# Patient Record
Sex: FEMALE | Race: WHITE | NOT HISPANIC OR LATINO
[De-identification: names, ages, dates, MRNs, and addresses within clinical notes are randomized per-mention and may not be internally consistent; named-entity substitution may affect disease eponyms.]

---

## 2022-11-04 ENCOUNTER — NON-APPOINTMENT (OUTPATIENT)
Age: 52
End: 2022-11-04

## 2024-06-04 ENCOUNTER — NON-APPOINTMENT (OUTPATIENT)
Age: 54
End: 2024-06-04

## 2024-06-11 ENCOUNTER — NON-APPOINTMENT (OUTPATIENT)
Age: 54
End: 2024-06-11

## 2024-06-18 ENCOUNTER — NON-APPOINTMENT (OUTPATIENT)
Age: 54
End: 2024-06-18

## 2024-06-18 ENCOUNTER — APPOINTMENT (OUTPATIENT)
Dept: SURGICAL ONCOLOGY | Facility: CLINIC | Age: 54
End: 2024-06-18
Payer: COMMERCIAL

## 2024-06-18 VITALS
HEART RATE: 78 BPM | OXYGEN SATURATION: 98 % | HEIGHT: 63 IN | WEIGHT: 128 LBS | DIASTOLIC BLOOD PRESSURE: 90 MMHG | SYSTOLIC BLOOD PRESSURE: 120 MMHG | BODY MASS INDEX: 22.68 KG/M2

## 2024-06-18 DIAGNOSIS — N61.0 MASTITIS WITHOUT ABSCESS: ICD-10-CM

## 2024-06-18 PROCEDURE — 99203 OFFICE O/P NEW LOW 30 MIN: CPT

## 2024-06-18 NOTE — CONSULT LETTER
[Dear  ___] : Dear ~SHERIDAN, [Consult Letter:] : I had the pleasure of evaluating your patient, [unfilled]. [Please see my note below.] : Please see my note below. [Sincerely,] : Sincerely, [FreeTextEntry2] : Dr. Kaylyn Coffey [FreeTextEntry3] : Sawyer Rosales M.D., FTAMARA. Associate Professor of [facility] [employer] [facility] [employer] at [facility]

## 2024-06-18 NOTE — ASSESSMENT
[FreeTextEntry1] : IMP: Carlene is a 54 y.o female with right breast infection. Completed antibiotics and symptoms have improved Patient plans to have breast reduction in near future, its important that she has her annual breast imaging 1 month prior to surgery.  PLAN: MMG/US 10/2024- ordered by GYN Return as needed or symptoms worsen   All medical entries were at my, Dr. Sawyer Rosales, direction. I have reviewed the chart and agree that the record accurately reflects my personal performance of the history, physical exam, assessment and plan. Our office Nurse Practitioner was present of the duration of the office visit.

## 2024-06-18 NOTE — HISTORY OF PRESENT ILLNESS
[de-identified] : Ms. Carlene Perez is a 54 year old female who presents for an initial consultation regarding mastitis, referred by Dr. Kaylyn Coffey.  Family history of breast cancer in her paternal grandmother  /US on 10/17/2023 shows no evidence of malignancy. BI-RADS 2   She has had reoccurring mastitis in her breasts since Feb. Most recent breast infection was in May, she completed her antibiotic treatment and symptoms have much improved.

## 2024-06-18 NOTE — PHYSICAL EXAM
[Normal Supraclavicular Lymph Nodes] : normal supraclavicular lymph nodes [Normal Axillary Lymph Nodes] : normal axillary lymph nodes [Normal] : oriented to person, place and time, with appropriate affect [FreeTextEntry1] : SC present for exam  [de-identified] : Normal S1,S2. Regular rate and rhythm [de-identified] : Complete breast exam performed in supine and upright position. No palpable masses, tenderness, nipple discharge, inversion, deviation or enlarged axillary or supraclavicular lymph nodes bilaterally. [de-identified] : Clear breath sounds bilaterally with normal respiratory effort.

## 2025-04-14 ENCOUNTER — NON-APPOINTMENT (OUTPATIENT)
Age: 55
End: 2025-04-14

## 2025-04-29 ENCOUNTER — NON-APPOINTMENT (OUTPATIENT)
Age: 55
End: 2025-04-29

## 2025-05-08 ENCOUNTER — NON-APPOINTMENT (OUTPATIENT)
Age: 55
End: 2025-05-08